# Patient Record
Sex: FEMALE | Race: BLACK OR AFRICAN AMERICAN | NOT HISPANIC OR LATINO | URBAN - METROPOLITAN AREA
[De-identification: names, ages, dates, MRNs, and addresses within clinical notes are randomized per-mention and may not be internally consistent; named-entity substitution may affect disease eponyms.]

---

## 2023-10-03 ENCOUNTER — OFFICE VISIT (OUTPATIENT)
Dept: OBSTETRICS AND GYNECOLOGY | Facility: CLINIC | Age: 28
End: 2023-10-03
Payer: COMMERCIAL

## 2023-10-03 VITALS — DIASTOLIC BLOOD PRESSURE: 78 MMHG | SYSTOLIC BLOOD PRESSURE: 118 MMHG | WEIGHT: 128.5 LBS

## 2023-10-03 DIAGNOSIS — Z12.4 CERVICAL CANCER SCREENING: ICD-10-CM

## 2023-10-03 DIAGNOSIS — Z01.419 ROUTINE GYNECOLOGICAL EXAMINATION: Primary | ICD-10-CM

## 2023-10-03 PROCEDURE — 3074F PR MOST RECENT SYSTOLIC BLOOD PRESSURE < 130 MM HG: ICD-10-PCS | Mod: CPTII,S$GLB,, | Performed by: OBSTETRICS & GYNECOLOGY

## 2023-10-03 PROCEDURE — 88175 CYTOPATH C/V AUTO FLUID REDO: CPT | Performed by: OBSTETRICS & GYNECOLOGY

## 2023-10-03 PROCEDURE — 99999 PR PBB SHADOW E&M-NEW PATIENT-LVL II: ICD-10-PCS | Mod: PBBFAC,,, | Performed by: OBSTETRICS & GYNECOLOGY

## 2023-10-03 PROCEDURE — 99999 PR PBB SHADOW E&M-NEW PATIENT-LVL II: CPT | Mod: PBBFAC,,, | Performed by: OBSTETRICS & GYNECOLOGY

## 2023-10-03 PROCEDURE — 99385 PR PREVENTIVE VISIT,NEW,18-39: ICD-10-PCS | Mod: S$GLB,,, | Performed by: OBSTETRICS & GYNECOLOGY

## 2023-10-03 PROCEDURE — 3074F SYST BP LT 130 MM HG: CPT | Mod: CPTII,S$GLB,, | Performed by: OBSTETRICS & GYNECOLOGY

## 2023-10-03 PROCEDURE — 99385 PREV VISIT NEW AGE 18-39: CPT | Mod: S$GLB,,, | Performed by: OBSTETRICS & GYNECOLOGY

## 2023-10-03 PROCEDURE — 3078F PR MOST RECENT DIASTOLIC BLOOD PRESSURE < 80 MM HG: ICD-10-PCS | Mod: CPTII,S$GLB,, | Performed by: OBSTETRICS & GYNECOLOGY

## 2023-10-03 PROCEDURE — 3078F DIAST BP <80 MM HG: CPT | Mod: CPTII,S$GLB,, | Performed by: OBSTETRICS & GYNECOLOGY

## 2023-10-03 NOTE — PROGRESS NOTES
29 yo female who presents for her first ever gyn visit.  No gyn complaints.  Cycles come q month.  Sexual partner is male.  Infrequent condom use.  Declines std testing.  Declines contraception.  Wants pap today.    ROS:  GENERAL: Denies weight gain or weight loss. Feeling well overall.   SKIN: Denies rash or lesions.   HEAD: Denies head injury or headache.   CHEST: Denies chest pain or shortness of breath.   CARDIOVASCULAR: Denies palpitations or left sided chest pain.   ABDOMEN: No abdominal pain, constipation, diarrhea, nausea, vomiting or rectal bleeding.   URINARY: No frequency, dysuria, hematuria, or burning on urination.  REPRODUCTIVE: no complainte  BREASTS: denies pain, lumps, or nipple discharge.   HEMATOLOGIC: No easy bruisability or excessive bleeding.   MUSCULOSKELETAL: Denies joint pain or swelling.   NEUROLOGIC: Denies syncope or weakness.   PSYCHIATRIC: Denies depression, anxiety or mood swings.         PE:   Vitals: /78   Wt 58.3 kg (128 lb 8.5 oz)   LMP 09/04/2023 (Approximate)   APPEARANCE: Well nourished, well developed, in no acute distress.  SKIN: Normal skin turgor, no lesions.  ABDOMEN: Soft. No tenderness or masses. No hepatosplenomegaly. No hernias.  BREASTS: Symmetrical, no skin changes or visible lesions. No palpable masses, nipple discharge or adenopathy bilaterally.  PELVIC: Normal external female genitalia without lesions. Normal hair distribution. Adequate perineal body, normal urethral meatus. Vagina moist and well rugated without lesions or discharge. Cervix pink and without lesions. No significant cystocele or rectocele. Bimanual exam showed uterus normal size, shape, position, mobile and nontender. Adnexa without masses or tenderness. Urethra and bladder normal.          AP  Routine gyn  -s/p normal breast exam:   -s/p normal pelvic exam:   -Pap collected  -STD testing: declined  -contraception: declined    JAMI Liu MD

## 2023-10-12 LAB
CLINICAL INFO: ABNORMAL
CYTO CVX: ABNORMAL
CYTOLOGIST CVX/VAG CYTO: ABNORMAL
CYTOLOGIST CVX/VAG CYTO: ABNORMAL
CYTOLOGY CMNT CVX/VAG CYTO-IMP: ABNORMAL
CYTOLOGY PAP THIN PREP EXPLANATION: ABNORMAL
DATE OF PREVIOUS PAP: ABNORMAL
DATE PREVIOUS BX: NO
GEN CATEG CVX/VAG CYTO-IMP: ABNORMAL
LMP START DATE: ABNORMAL
MICROORGANISM CVX/VAG CYTO: ABNORMAL
PATHOLOGIST CVX/VAG CYTO: ABNORMAL
SERVICE CMNT-IMP: ABNORMAL
SPECIMEN SOURCE CVX/VAG CYTO: ABNORMAL
STAT OF ADQ CVX/VAG CYTO-IMP: ABNORMAL

## 2023-11-06 ENCOUNTER — TELEPHONE (OUTPATIENT)
Dept: OBSTETRICS AND GYNECOLOGY | Facility: CLINIC | Age: 28
End: 2023-11-06
Payer: COMMERCIAL

## 2023-11-08 ENCOUNTER — TELEPHONE (OUTPATIENT)
Dept: OBSTETRICS AND GYNECOLOGY | Facility: CLINIC | Age: 28
End: 2023-11-08
Payer: COMMERCIAL

## 2023-11-08 NOTE — TELEPHONE ENCOUNTER
Spoke with patient and she is returning the provider phone call. She wants to know the reason for the procedure    Colpo appt :  11/29 at 3:00pm    Patient is aware       Please advise.

## 2023-11-08 NOTE — TELEPHONE ENCOUNTER
----- Message from Marah Carbajal sent at 11/6/2023  9:52 AM CST -----  Contact: pt  Type:  Patient Returning Call    Who Called:pt   Who Left Message for Patient:Mary   Does the patient know what this is regarding?:schedule procedure   Would the patient rather a call back or a response via [facility]? Call   Best Call Back Number:[phone]  Additional Information:

## 2024-01-08 ENCOUNTER — TELEPHONE (OUTPATIENT)
Dept: OBSTETRICS AND GYNECOLOGY | Facility: CLINIC | Age: 29
End: 2024-01-08
Payer: COMMERCIAL

## 2024-01-10 ENCOUNTER — TELEPHONE (OUTPATIENT)
Dept: OBSTETRICS AND GYNECOLOGY | Facility: CLINIC | Age: 29
End: 2024-01-10
Payer: COMMERCIAL

## 2024-01-24 ENCOUNTER — TELEPHONE (OUTPATIENT)
Dept: OBSTETRICS AND GYNECOLOGY | Facility: CLINIC | Age: 29
End: 2024-01-24
Payer: COMMERCIAL

## 2024-01-25 NOTE — TELEPHONE ENCOUNTER
Lvm for pt to call back to reschedule appointment due to Dr. Liu being out of the office.    [facility]

## 2024-01-26 ENCOUNTER — TELEPHONE (OUTPATIENT)
Dept: OBSTETRICS AND GYNECOLOGY | Facility: CLINIC | Age: 29
End: 2024-01-26
Payer: COMMERCIAL

## 2024-01-26 NOTE — TELEPHONE ENCOUNTER
Spoke with pt about her colpo procedure.    Pt had questions about what the procedure even is due to no one ever explaining what was going on.    [facility] and explained the actual procedure to her.    Pt verbalized an understanding to that.    Also explained what Dr. Wing has said about her pap smear results and that she can wait until Dr. Liu comes back in May.    Pt verbalized an understanding to that as well.     Scheduled pt in May with Dr. Liu.    Informed pt that if she changed her mind and would like to do it sooner we can get her in with someone else.

## 2024-01-31 ENCOUNTER — PATIENT MESSAGE (OUTPATIENT)
Dept: OBSTETRICS AND GYNECOLOGY | Facility: CLINIC | Age: 29
End: 2024-01-31
Payer: COMMERCIAL

## 2024-09-30 ENCOUNTER — TELEPHONE (OUTPATIENT)
Dept: OBSTETRICS AND GYNECOLOGY | Facility: CLINIC | Age: 29
End: 2024-09-30
Payer: COMMERCIAL

## 2024-09-30 NOTE — TELEPHONE ENCOUNTER
Lvm for pt, pt will need to reschedule 11/11 visit with Dr. Liu as she is out of office that afternoon.     Left message in portal.

## 2025-01-17 ENCOUNTER — LAB VISIT (OUTPATIENT)
Dept: LAB | Facility: HOSPITAL | Age: 30
End: 2025-01-17
Attending: OBSTETRICS & GYNECOLOGY
Payer: COMMERCIAL

## 2025-01-17 ENCOUNTER — OFFICE VISIT (OUTPATIENT)
Dept: OBSTETRICS AND GYNECOLOGY | Facility: CLINIC | Age: 30
End: 2025-01-17
Payer: COMMERCIAL

## 2025-01-17 VITALS — WEIGHT: 125.69 LBS | DIASTOLIC BLOOD PRESSURE: 67 MMHG | SYSTOLIC BLOOD PRESSURE: 104 MMHG

## 2025-01-17 DIAGNOSIS — Z01.419 ROUTINE GYNECOLOGICAL EXAMINATION: Primary | ICD-10-CM

## 2025-01-17 DIAGNOSIS — Z01.419 ROUTINE GYNECOLOGICAL EXAMINATION: ICD-10-CM

## 2025-01-17 DIAGNOSIS — Z11.3 SCREENING EXAMINATION FOR STD (SEXUALLY TRANSMITTED DISEASE): ICD-10-CM

## 2025-01-17 DIAGNOSIS — Z12.4 CERVICAL CANCER SCREENING: ICD-10-CM

## 2025-01-17 LAB
ALBUMIN SERPL BCP-MCNC: 4.2 G/DL (ref 3.5–5.2)
ALP SERPL-CCNC: 36 U/L (ref 40–150)
ALT SERPL W/O P-5'-P-CCNC: 19 U/L (ref 10–44)
ANION GAP SERPL CALC-SCNC: 13 MMOL/L (ref 8–16)
AST SERPL-CCNC: 21 U/L (ref 10–40)
BASOPHILS # BLD AUTO: 0.03 K/UL (ref 0–0.2)
BASOPHILS NFR BLD: 0.6 % (ref 0–1.9)
BILIRUB SERPL-MCNC: 0.9 MG/DL (ref 0.1–1)
BUN SERPL-MCNC: 9 MG/DL (ref 6–20)
CALCIUM SERPL-MCNC: 9.7 MG/DL (ref 8.7–10.5)
CHLORIDE SERPL-SCNC: 106 MMOL/L (ref 95–110)
CHOLEST SERPL-MCNC: 185 MG/DL (ref 120–199)
CHOLEST/HDLC SERPL: 2.3 {RATIO} (ref 2–5)
CO2 SERPL-SCNC: 20 MMOL/L (ref 23–29)
CREAT SERPL-MCNC: 0.9 MG/DL (ref 0.5–1.4)
DIFFERENTIAL METHOD BLD: NORMAL
EOSINOPHIL # BLD AUTO: 0 K/UL (ref 0–0.5)
EOSINOPHIL NFR BLD: 0.8 % (ref 0–8)
ERYTHROCYTE [DISTWIDTH] IN BLOOD BY AUTOMATED COUNT: 12.4 % (ref 11.5–14.5)
EST. GFR  (NO RACE VARIABLE): >60 ML/MIN/1.73 M^2
ESTIMATED AVG GLUCOSE: 114 MG/DL (ref 68–131)
GLUCOSE SERPL-MCNC: 90 MG/DL (ref 70–110)
HBA1C MFR BLD: 5.6 % (ref 4–5.6)
HBV SURFACE AB SER-ACNC: <3 MIU/ML
HBV SURFACE AB SER-ACNC: NORMAL M[IU]/ML
HBV SURFACE AG SERPL QL IA: NORMAL
HCT VFR BLD AUTO: 42.6 % (ref 37–48.5)
HCV AB SERPL QL IA: NORMAL
HDLC SERPL-MCNC: 79 MG/DL (ref 40–75)
HDLC SERPL: 42.7 % (ref 20–50)
HGB BLD-MCNC: 13.8 G/DL (ref 12–16)
HIV 1+2 AB+HIV1 P24 AG SERPL QL IA: NORMAL
IMM GRANULOCYTES # BLD AUTO: 0 K/UL (ref 0–0.04)
IMM GRANULOCYTES NFR BLD AUTO: 0 % (ref 0–0.5)
LDLC SERPL CALC-MCNC: 90.6 MG/DL (ref 63–159)
LYMPHOCYTES # BLD AUTO: 1.9 K/UL (ref 1–4.8)
LYMPHOCYTES NFR BLD: 38.5 % (ref 18–48)
MCH RBC QN AUTO: 29.2 PG (ref 27–31)
MCHC RBC AUTO-ENTMCNC: 32.4 G/DL (ref 32–36)
MCV RBC AUTO: 90 FL (ref 82–98)
MONOCYTES # BLD AUTO: 0.4 K/UL (ref 0.3–1)
MONOCYTES NFR BLD: 8.1 % (ref 4–15)
NEUTROPHILS # BLD AUTO: 2.6 K/UL (ref 1.8–7.7)
NEUTROPHILS NFR BLD: 52 % (ref 38–73)
NONHDLC SERPL-MCNC: 106 MG/DL
NRBC BLD-RTO: 0 /100 WBC
PLATELET # BLD AUTO: 164 K/UL (ref 150–450)
PMV BLD AUTO: 11.4 FL (ref 9.2–12.9)
POTASSIUM SERPL-SCNC: 3.9 MMOL/L (ref 3.5–5.1)
PROT SERPL-MCNC: 7.9 G/DL (ref 6–8.4)
RBC # BLD AUTO: 4.73 M/UL (ref 4–5.4)
SODIUM SERPL-SCNC: 139 MMOL/L (ref 136–145)
TREPONEMA PALLIDUM IGG+IGM AB [PRESENCE] IN SERUM OR PLASMA BY IMMUNOASSAY: NONREACTIVE
TRIGL SERPL-MCNC: 77 MG/DL (ref 30–150)
TSH SERPL DL<=0.005 MIU/L-ACNC: 2.68 UIU/ML (ref 0.4–4)
WBC # BLD AUTO: 4.93 K/UL (ref 3.9–12.7)

## 2025-01-17 PROCEDURE — 86695 HERPES SIMPLEX TYPE 1 TEST: CPT | Performed by: OBSTETRICS & GYNECOLOGY

## 2025-01-17 PROCEDURE — 87340 HEPATITIS B SURFACE AG IA: CPT | Performed by: OBSTETRICS & GYNECOLOGY

## 2025-01-17 PROCEDURE — 85025 COMPLETE CBC W/AUTO DIFF WBC: CPT | Performed by: OBSTETRICS & GYNECOLOGY

## 2025-01-17 PROCEDURE — 3078F DIAST BP <80 MM HG: CPT | Mod: CPTII,S$GLB,, | Performed by: OBSTETRICS & GYNECOLOGY

## 2025-01-17 PROCEDURE — 80053 COMPREHEN METABOLIC PANEL: CPT | Performed by: OBSTETRICS & GYNECOLOGY

## 2025-01-17 PROCEDURE — 3074F SYST BP LT 130 MM HG: CPT | Mod: CPTII,S$GLB,, | Performed by: OBSTETRICS & GYNECOLOGY

## 2025-01-17 PROCEDURE — 99999 PR PBB SHADOW E&M-EST. PATIENT-LVL II: CPT | Mod: PBBFAC,,, | Performed by: OBSTETRICS & GYNECOLOGY

## 2025-01-17 PROCEDURE — 80061 LIPID PANEL: CPT | Performed by: OBSTETRICS & GYNECOLOGY

## 2025-01-17 PROCEDURE — 87491 CHLMYD TRACH DNA AMP PROBE: CPT | Performed by: OBSTETRICS & GYNECOLOGY

## 2025-01-17 PROCEDURE — 84443 ASSAY THYROID STIM HORMONE: CPT | Performed by: OBSTETRICS & GYNECOLOGY

## 2025-01-17 PROCEDURE — 99395 PREV VISIT EST AGE 18-39: CPT | Mod: S$GLB,,, | Performed by: OBSTETRICS & GYNECOLOGY

## 2025-01-17 PROCEDURE — 86593 SYPHILIS TEST NON-TREP QUANT: CPT | Performed by: OBSTETRICS & GYNECOLOGY

## 2025-01-17 PROCEDURE — 1159F MED LIST DOCD IN RCRD: CPT | Mod: CPTII,S$GLB,, | Performed by: OBSTETRICS & GYNECOLOGY

## 2025-01-17 PROCEDURE — 36415 COLL VENOUS BLD VENIPUNCTURE: CPT | Performed by: OBSTETRICS & GYNECOLOGY

## 2025-01-17 PROCEDURE — 86803 HEPATITIS C AB TEST: CPT | Performed by: OBSTETRICS & GYNECOLOGY

## 2025-01-17 PROCEDURE — 88175 CYTOPATH C/V AUTO FLUID REDO: CPT | Performed by: PATHOLOGY

## 2025-01-17 PROCEDURE — 83036 HEMOGLOBIN GLYCOSYLATED A1C: CPT | Performed by: OBSTETRICS & GYNECOLOGY

## 2025-01-17 PROCEDURE — 87389 HIV-1 AG W/HIV-1&-2 AB AG IA: CPT | Performed by: OBSTETRICS & GYNECOLOGY

## 2025-01-17 PROCEDURE — 88141 CYTOPATH C/V INTERPRET: CPT | Mod: ,,, | Performed by: PATHOLOGY

## 2025-01-17 PROCEDURE — 86706 HEP B SURFACE ANTIBODY: CPT | Performed by: OBSTETRICS & GYNECOLOGY

## 2025-01-17 NOTE — PROGRESS NOTES
30yo female who presents for her first ever gyn visit.  No gyn complaints.  Wants std testing.  Declines contraception.  Wants pap today.    ROS:  GENERAL: Denies weight gain or weight loss. Feeling well overall.   SKIN: Denies rash or lesions.   HEAD: Denies head injury or headache.   CHEST: Denies chest pain or shortness of breath.   CARDIOVASCULAR: Denies palpitations or left sided chest pain.   ABDOMEN: No abdominal pain, constipation, diarrhea, nausea, vomiting or rectal bleeding.   URINARY: No frequency, dysuria, hematuria, or burning on urination.  REPRODUCTIVE: no complainte  BREASTS: denies pain, lumps, or nipple discharge.   HEMATOLOGIC: No easy bruisability or excessive bleeding.   MUSCULOSKELETAL: Denies joint pain or swelling.   NEUROLOGIC: Denies syncope or weakness.   PSYCHIATRIC: Denies depression, anxiety or mood swings.         PE:   Vitals: /67   Wt 57 kg (125 lb 10.6 oz)   LMP 12/21/2024 (Approximate)   APPEARANCE: Well nourished, well developed, in no acute distress.  SKIN: Normal skin turgor, no lesions.  ABDOMEN: Soft. No tenderness or masses. No hepatosplenomegaly. No hernias.  BREASTS: Symmetrical, no skin changes or visible lesions. No palpable masses, nipple discharge or adenopathy bilaterally.  PELVIC: Normal external female genitalia without lesions. Normal hair distribution. Adequate perineal body, normal urethral meatus. Vagina moist and well rugated without lesions or discharge. Cervix pink and without lesions. No significant cystocele or rectocele. Bimanual exam showed uterus normal size, shape, position, mobile and nontender. Adnexa without masses or tenderness. Urethra and bladder normal.          AP  Routine gyn  -s/p normal breast exam:   -s/p normal pelvic exam:   -Pap collected  -STD testing: everything ordered  -contraception: declined    JAMI Liu MD

## 2025-01-18 LAB
HSV1 IGG SERPL QL IA: POSITIVE
HSV2 IGG SERPL QL IA: NEGATIVE

## 2025-01-22 LAB
C TRACH DNA SPEC QL NAA+PROBE: NOT DETECTED
N GONORRHOEA DNA SPEC QL NAA+PROBE: NOT DETECTED

## 2025-01-28 LAB
FINAL PATHOLOGIC DIAGNOSIS: ABNORMAL
Lab: ABNORMAL

## 2025-03-19 ENCOUNTER — PATIENT MESSAGE (OUTPATIENT)
Dept: OBSTETRICS AND GYNECOLOGY | Facility: CLINIC | Age: 30
End: 2025-03-19
Payer: COMMERCIAL

## 2025-03-19 DIAGNOSIS — B00.9 HSV INFECTION: Primary | ICD-10-CM

## 2025-03-19 RX ORDER — ACYCLOVIR 800 MG/1
800 TABLET ORAL 2 TIMES DAILY
Qty: 30 TABLET | Refills: 3 | Status: SHIPPED | OUTPATIENT
Start: 2025-03-19 | End: 2025-03-24